# Patient Record
Sex: FEMALE | Race: WHITE | NOT HISPANIC OR LATINO | Employment: UNEMPLOYED | URBAN - METROPOLITAN AREA
[De-identification: names, ages, dates, MRNs, and addresses within clinical notes are randomized per-mention and may not be internally consistent; named-entity substitution may affect disease eponyms.]

---

## 2023-06-01 DIAGNOSIS — R93.89 ABNORMAL FINDING ON ULTRASOUND: Primary | ICD-10-CM

## 2023-06-02 ENCOUNTER — PROCEDURE VISIT (OUTPATIENT)
Dept: MATERNAL FETAL MEDICINE | Facility: CLINIC | Age: 33
End: 2023-06-02
Payer: MEDICAID

## 2023-06-02 ENCOUNTER — OFFICE VISIT (OUTPATIENT)
Dept: MATERNAL FETAL MEDICINE | Facility: CLINIC | Age: 33
End: 2023-06-02
Payer: MEDICAID

## 2023-06-02 VITALS
WEIGHT: 127.81 LBS | BODY MASS INDEX: 23.52 KG/M2 | RESPIRATION RATE: 18 BRPM | SYSTOLIC BLOOD PRESSURE: 115 MMHG | HEIGHT: 62 IN | HEART RATE: 102 BPM | DIASTOLIC BLOOD PRESSURE: 79 MMHG

## 2023-06-02 DIAGNOSIS — R93.89 ABNORMAL FINDING ON ULTRASOUND: Primary | ICD-10-CM

## 2023-06-02 DIAGNOSIS — R93.89 ABNORMAL FINDING ON ULTRASOUND: ICD-10-CM

## 2023-06-02 DIAGNOSIS — O34.212 ENCOUNTER FOR MATERNAL CARE FOR VERTICAL SCAR FROM PREVIOUS CESAREAN DELIVERY: ICD-10-CM

## 2023-06-02 DIAGNOSIS — O09.522 AMA (ADVANCED MATERNAL AGE) MULTIGRAVIDA 35+, SECOND TRIMESTER: ICD-10-CM

## 2023-06-02 DIAGNOSIS — O09.293 IUGR (INTRAUTERINE GROWTH RETARDATION) IN PRIOR PREGNANCY, PREGNANT, THIRD TRIMESTER: ICD-10-CM

## 2023-06-02 DIAGNOSIS — O34.592 UTERINE ABNORMALITY IN PREGNANCY IN SECOND TRIMESTER: Primary | ICD-10-CM

## 2023-06-02 DIAGNOSIS — O28.3 ABNORMAL ULTRASONIC FINDING ON ANTENATAL SCREENING OF MOTHER, DELIVERED, WITH MENTION OF POSTPARTUM COMPLICATION: ICD-10-CM

## 2023-06-02 PROCEDURE — 3074F PR MOST RECENT SYSTOLIC BLOOD PRESSURE < 130 MM HG: ICD-10-PCS | Mod: CPTII,S$GLB,, | Performed by: OBSTETRICS & GYNECOLOGY

## 2023-06-02 PROCEDURE — 3008F BODY MASS INDEX DOCD: CPT | Mod: CPTII,S$GLB,, | Performed by: OBSTETRICS & GYNECOLOGY

## 2023-06-02 PROCEDURE — 3078F PR MOST RECENT DIASTOLIC BLOOD PRESSURE < 80 MM HG: ICD-10-PCS | Mod: CPTII,S$GLB,, | Performed by: OBSTETRICS & GYNECOLOGY

## 2023-06-02 PROCEDURE — 3008F PR BODY MASS INDEX (BMI) DOCUMENTED: ICD-10-PCS | Mod: CPTII,S$GLB,, | Performed by: OBSTETRICS & GYNECOLOGY

## 2023-06-02 PROCEDURE — 99204 PR OFFICE/OUTPT VISIT, NEW, LEVL IV, 45-59 MIN: ICD-10-PCS | Mod: TH,25,S$GLB, | Performed by: OBSTETRICS & GYNECOLOGY

## 2023-06-02 PROCEDURE — 76811 OB US DETAILED SNGL FETUS: CPT | Mod: S$GLB,,, | Performed by: OBSTETRICS & GYNECOLOGY

## 2023-06-02 PROCEDURE — 76817 TRANSVAGINAL US OBSTETRIC: CPT | Mod: S$GLB,,, | Performed by: OBSTETRICS & GYNECOLOGY

## 2023-06-02 PROCEDURE — 76817 PR US, OB, TRANSVAG APPROACH: ICD-10-PCS | Mod: S$GLB,,, | Performed by: OBSTETRICS & GYNECOLOGY

## 2023-06-02 PROCEDURE — 3078F DIAST BP <80 MM HG: CPT | Mod: CPTII,S$GLB,, | Performed by: OBSTETRICS & GYNECOLOGY

## 2023-06-02 PROCEDURE — 3074F SYST BP LT 130 MM HG: CPT | Mod: CPTII,S$GLB,, | Performed by: OBSTETRICS & GYNECOLOGY

## 2023-06-02 PROCEDURE — 76811 PR US, OB FETAL EVAL & EXAM, TRANSABDOM,FIRST GESTATION: ICD-10-PCS | Mod: S$GLB,,, | Performed by: OBSTETRICS & GYNECOLOGY

## 2023-06-02 PROCEDURE — 99204 OFFICE O/P NEW MOD 45 MIN: CPT | Mod: TH,25,S$GLB, | Performed by: OBSTETRICS & GYNECOLOGY

## 2023-06-02 NOTE — PROGRESS NOTES
Maternal Fetal Medicine follow up consult    Subjective       Patient ID: Sofie Viramontes is a 33 y.o. female  at 26w4d gestation with Estimated Date of Delivery: 23  who is here for consultation by MFM.    Chief Complaint: MFM Establish Care  (Possible acreta )      Pregnancy complications include:   Problem List Items Addressed This Visit          Obstetric    AMA (advanced maternal age) multigravida 33+, second trimester     I discussed with her that the higher risk of aneuploidy related to advanced maternal age is caused by meiotic nondysjunction.  The age-related risk of Down syndrome and any chromosome problem were discussed.  she would not consider termination of pregnancy even if anomalies or aneuploidy is detected .. She was advised of the screening nature of the Level 2 ultrasound as well as the screening nature of a quad screen to check for the risk of aneuploidy with normal ultrasound and or quad screen testing that would lower the background risk of aneuploidy.        She was advised that the only diagnostic test at this time is genetic amniocentesis.  Benefits and risks of a genetic amniocentesis were discussed. Patient was offered genetic amniocentesis, after thorough counseling on benefits and risks of procedure, with very remote risk of complications and in some studies no identifiable increased risk, above background risk of spontaneous miscarriage, while some current data suggests risk up to 1 in 800 with early amniocentesis prior to 24 weeks, and remote risk of need for emergency delivery with all the complications of prematurity with amniocentesis after 24 weeks. She declined amniocentesis . She is aware of the need for  evaluation.    She was counseled on Cell free DNA understanding that it is an enhanced screening test but not a diagnostic test. It assesses risk for common aneuploidies such as Trisomy 13, 18, and 21 by evaluating cell-free fetal DNA in maternal circulation with a  false positive rate less than 0.5% for Trisomy 21 and less reliable for Trisomy 13 and Trisomy 18. She already did quad screen that was negative.    The higher risk of anomalies associated with advanced maternal age was also addressed. The reassurance obtained by the normal ultrasound and the limitations of ultrasound in checking for anomalies was explained with the need for regular  evaluations.     I recommend do follow-up ultrasound in 6 weeks and every 4 weeks to monitor fetal growth. She was advised to do to continue fetal kick counts till delivery in view of the higher risk of fetal demise in utero closer to term with advanced maternal age.     With advanced maternal age and history of small size baby.  There is benefit of starting baby aspirin up to 28 weeks and agreed to start baby aspirin today that be continued till delivery to decrease the risk of preeclampsia.         Maternal care for vertical scar from previous  delivery    thin lower uterine segment in pregnancy in second trimester - Primary     Discussed with her that the lower uterine segment is subjectively thinned out a little over 2 mm.  There is no evidence of uterine wall defect.  There is some data to show that if lower uterine segment is over 3-1/2 mm the risk of uterine rupture with labor is lower.  With this length cervix consideration to avoid a TOLAC is reasonable in view the potentially higher risk of uterine rupture with attempted labor.  We will plan to recheck her in 6 weeks check fetal growth and reassess lower uterine segment thickness with a vaginal ultrasound.      The placenta is posterior and away from the scar with no evidence of placenta accreta at this time.  Limitations discussed.         suspected accreta     History of small babies in previous pregnancy     We will check growth in 6 weeks and every 4 weeks after that till the end of the pregnancy              Past Medical History:   Diagnosis Date    ADHD  (attention deficit hyperactivity disorder)     Anxiety        Past Surgical History:   Procedure Laterality Date     SECTION  2010    INSERTION OF PERCUTANEOUS ENDOSCOPIC GASTROSTOMY (PEG) FEEDING TUBE      NISSEN FUNDOPLICATION      When patient was a child x 3       History reviewed. No pertinent family history.    Social History     Socioeconomic History    Marital status:    Tobacco Use    Smoking status: Former     Types: Cigarettes    Smokeless tobacco: Never   Substance and Sexual Activity    Alcohol use: Not Currently    Drug use: Never    Sexual activity: Not Currently     Partners: Male       Current Outpatient Medications   Medication Sig Dispense Refill    diphenhydramine HCl (BENADRYL ALLERGY ORAL) Take by mouth as needed.      prenat vit 17/iron/folic/om3,6 (PNV-TOTAL ORAL) Take by mouth.       No current facility-administered medications for this visit.       Review of patient's allergies indicates:   Allergen Reactions    Latex     Meperidine          Medications:  Current Outpatient Medications   Medication Instructions    diphenhydramine HCl (BENADRYL ALLERGY ORAL) Oral, As needed (PRN)    prenat vit 17/iron/folic/om3,6 (PNV-TOTAL ORAL) Oral       33 y.o. female  at 26w4d gestation with Estimated Date of Delivery: 9/3/23 by LMP, consistent with early US. She is sent for MFM consultation for possible placenta accreta/increta. She is of advanced maternal age and will be 33 by the [facility]. She denies any personal or family history of aneuploidy or anomalies. She denies any exposure to high fevers, viral rashes, illicit drugs or alcohol in this pregnancy.      She had an outside ultrasound suggested the thin lower uterine segment and placenta accreta.  She would previous child was around 5+ lb.  At term  She denies any leaking fluid, vaginal bleeding, contractions, decreased fetal movement. Denies headaches, visual disturbances, or epigastric pain    Review of Systems   Constitutional:   Negative for activity change, chills, fatigue and fever.   HENT:  Negative for nasal congestion.    Eyes:  Negative for visual disturbance.   Respiratory:  Negative for cough, shortness of breath and wheezing.    Cardiovascular:  Negative for chest pain, palpitations and leg swelling.   Gastrointestinal:  Negative for abdominal pain, constipation, diarrhea, nausea, vomiting and reflux.   Endocrine: Negative for diabetes, hyperthyroidism and hypothyroidism.   Genitourinary:  Negative for bladder incontinence, frequency, hematuria, pelvic pain, urgency, vaginal bleeding, vaginal discharge and vaginal pain.   Musculoskeletal:  Negative for back pain, joint swelling and leg pain.   Integumentary:  Negative for rash.   Neurological:  Negative for seizures and headaches.   Psychiatric/Behavioral:  Negative for depression. The patient is not nervous/anxious.    All other systems reviewed and are negative.        Objective     Physical Exam  Vitals and nursing note reviewed.   Constitutional:       General: She is not in acute distress.     Appearance: Normal appearance.   HENT:      Head: Normocephalic and atraumatic.      Nose: Nose normal. No congestion or epistaxis.      Mouth/Throat:      Pharynx: Oropharynx is clear.   Eyes:      General: No scleral icterus.     Pupils: Pupils are equal, round, and reactive to light.   Cardiovascular:      Rate and Rhythm: Normal rate and regular rhythm.   Pulmonary:      Effort: No respiratory distress.      Breath sounds: Normal breath sounds. No wheezing.   Abdominal:      General: Abdomen is flat.      Palpations: Abdomen is soft.      Tenderness: There is no abdominal tenderness. There is no right CVA tenderness, left CVA tenderness or guarding.      Comments: No CVA tenderness gravid uterus.    Musculoskeletal:         General: Normal range of motion.      Cervical back: Neck supple.      Right lower leg: No edema.      Left lower leg: No edema.   Skin:     General: Skin is  warm.      Findings: No bruising or rash.   Neurological:      General: No focal deficit present.      Mental Status: She is oriented to person, place, and time.      Deep Tendon Reflexes: Reflexes normal.      Comments: Normal reflexes   Psychiatric:         Mood and Affect: Mood normal.         Behavior: Behavior normal.         Thought Content: Thought content normal.         Judgment: Judgment normal.        Assessment and Plan     ASSESSMENT/PLAN:     33 y.o.  female with IUP at 26w4d    AMA (advanced maternal age) multigravida 35+, second trimester  I discussed with her that the higher risk of aneuploidy related to advanced maternal age is caused by meiotic nondysjunction.  The age-related risk of Down syndrome and any chromosome problem were discussed.  she would not consider termination of pregnancy even if anomalies or aneuploidy is detected .. She was advised of the screening nature of the Level 2 ultrasound as well as the screening nature of a quad screen to check for the risk of aneuploidy with normal ultrasound and or quad screen testing that would lower the background risk of aneuploidy.        She was advised that the only diagnostic test at this time is genetic amniocentesis.  Benefits and risks of a genetic amniocentesis were discussed. Patient was offered genetic amniocentesis, after thorough counseling on benefits and risks of procedure, with very remote risk of complications and in some studies no identifiable increased risk, above background risk of spontaneous miscarriage, while some current data suggests risk up to 1 in 800 with early amniocentesis prior to 24 weeks, and remote risk of need for emergency delivery with all the complications of prematurity with amniocentesis after 24 weeks. She declined amniocentesis . She is aware of the need for  evaluation.    She was counseled on Cell free DNA understanding that it is an enhanced screening test but not a diagnostic test. It  assesses risk for common aneuploidies such as Trisomy 13, 18, and 21 by evaluating cell-free fetal DNA in maternal circulation with a false positive rate less than 0.5% for Trisomy 21 and less reliable for Trisomy 13 and Trisomy 18. She already did quad screen that was negative.    The higher risk of anomalies associated with advanced maternal age was also addressed. The reassurance obtained by the normal ultrasound and the limitations of ultrasound in checking for anomalies was explained with the need for regular  evaluations.     I recommend do follow-up ultrasound in 6 weeks and every 4 weeks to monitor fetal growth. She was advised to do to continue fetal kick counts till delivery in view of the higher risk of fetal demise in utero closer to term with advanced maternal age.     With advanced maternal age and history of small size baby.  There is benefit of starting baby aspirin up to 28 weeks and agreed to start baby aspirin today that be continued till delivery to decrease the risk of preeclampsia.    thin lower uterine segment in pregnancy in second trimester  Discussed with her that the lower uterine segment is subjectively thinned out a little over 2 mm.  There is no evidence of uterine wall defect.  There is some data to show that if lower uterine segment is over 3-1/2 mm the risk of uterine rupture with labor is lower.  With this length cervix consideration to avoid a TOLAC is reasonable in view the potentially higher risk of uterine rupture with attempted labor.  We will plan to recheck her in 6 weeks check fetal growth and reassess lower uterine segment thickness with a vaginal ultrasound.      The placenta is posterior and away from the scar with no evidence of placenta accreta at this time.  Limitations discussed.    History of small babies in previous pregnancy  We will check growth in 6 weeks and every 4 weeks after that till the end of the pregnancy    Concern for placenta  increta/accreta    Advanced maternal age     F/u in 6 weeks for MFM visit  F/u in 6 weeks for US        Components of this note were documented using voice recognition systems; and are subject to errors not corrected at proof reading.  Please contact the author for any clarifications.

## 2023-06-03 PROBLEM — O09.293 IUGR (INTRAUTERINE GROWTH RETARDATION) IN PRIOR PREGNANCY, PREGNANT, THIRD TRIMESTER: Status: ACTIVE | Noted: 2023-06-03

## 2023-06-03 PROBLEM — O28.3 ABNORMAL ULTRASONIC FINDING ON ANTENATAL SCREENING OF MOTHER, DELIVERED, WITH MENTION OF POSTPARTUM COMPLICATION: Status: ACTIVE | Noted: 2023-06-03

## 2023-06-03 NOTE — ASSESSMENT & PLAN NOTE
I discussed with her that the higher risk of aneuploidy related to advanced maternal age is caused by meiotic nondysjunction.  The age-related risk of Down syndrome and any chromosome problem were discussed.  she would not consider termination of pregnancy even if anomalies or aneuploidy is detected .. She was advised of the screening nature of the Level 2 ultrasound as well as the screening nature of a quad screen to check for the risk of aneuploidy with normal ultrasound and or quad screen testing that would lower the background risk of aneuploidy.        She was advised that the only diagnostic test at this time is genetic amniocentesis.  Benefits and risks of a genetic amniocentesis were discussed. Patient was offered genetic amniocentesis, after thorough counseling on benefits and risks of procedure, with very remote risk of complications and in some studies no identifiable increased risk, above background risk of spontaneous miscarriage, while some current data suggests risk up to 1 in 800 with early amniocentesis prior to 24 weeks, and remote risk of need for emergency delivery with all the complications of prematurity with amniocentesis after 24 weeks. She declined amniocentesis . She is aware of the need for  evaluation.    She was counseled on Cell free DNA understanding that it is an enhanced screening test but not a diagnostic test. It assesses risk for common aneuploidies such as Trisomy 13, 18, and 21 by evaluating cell-free fetal DNA in maternal circulation with a false positive rate less than 0.5% for Trisomy 21 and less reliable for Trisomy 13 and Trisomy 18. She already did quad screen that was negative.    The higher risk of anomalies associated with advanced maternal age was also addressed. The reassurance obtained by the normal ultrasound and the limitations of ultrasound in checking for anomalies was explained with the need for regular  evaluations.     I recommend do follow-up  ultrasound in 6 weeks and every 4 weeks to monitor fetal growth. She was advised to do to continue fetal kick counts till delivery in view of the higher risk of fetal demise in utero closer to term with advanced maternal age.     With advanced maternal age and history of small size baby.  There is benefit of starting baby aspirin up to 28 weeks and agreed to start baby aspirin today that be continued till delivery to decrease the risk of preeclampsia.

## 2023-06-03 NOTE — ASSESSMENT & PLAN NOTE
Discussed with her that the lower uterine segment is subjectively thinned out a little over 2 mm.  There is no evidence of uterine wall defect.  There is some data to show that if lower uterine segment is over 3-1/2 mm the risk of uterine rupture with labor is lower.  With this length cervix consideration to avoid a TOLAC is reasonable in view the potentially higher risk of uterine rupture with attempted labor.  We will plan to recheck her in 6 weeks check fetal growth and reassess lower uterine segment thickness with a vaginal ultrasound.      The placenta is posterior and away from the scar with no evidence of placenta accreta at this time.  Limitations discussed.

## 2023-07-14 ENCOUNTER — TELEPHONE (OUTPATIENT)
Dept: MATERNAL FETAL MEDICINE | Facility: CLINIC | Age: 33
End: 2023-07-14